# Patient Record
Sex: FEMALE | Race: WHITE | Employment: UNEMPLOYED | URBAN - METROPOLITAN AREA
[De-identification: names, ages, dates, MRNs, and addresses within clinical notes are randomized per-mention and may not be internally consistent; named-entity substitution may affect disease eponyms.]

---

## 2024-08-08 ENCOUNTER — HOSPITAL ENCOUNTER (OUTPATIENT)
Facility: HOSPITAL | Age: 44
Setting detail: OBSERVATION
Discharge: HOME | End: 2024-08-10
Attending: EMERGENCY MEDICINE | Admitting: STUDENT IN AN ORGANIZED HEALTH CARE EDUCATION/TRAINING PROGRAM
Payer: MEDICAID

## 2024-08-08 DIAGNOSIS — K81.9 CHOLECYSTITIS: Primary | ICD-10-CM

## 2024-08-08 DIAGNOSIS — N83.201 RIGHT OVARIAN CYST: ICD-10-CM

## 2024-08-08 DIAGNOSIS — R10.9 RIGHT SIDED ABDOMINAL PAIN: ICD-10-CM

## 2024-08-08 DIAGNOSIS — K81.0 ACUTE CHOLECYSTITIS: ICD-10-CM

## 2024-08-08 LAB
ALBUMIN SERPL-MCNC: 5 G/DL (ref 3.2–4.8)
ALBUMIN/GLOB SERPL: 1.6 {RATIO} (ref 1–2)
ALP LIVER SERPL-CCNC: 57 U/L
ALT SERPL-CCNC: 22 U/L
ANION GAP SERPL CALC-SCNC: 11 MMOL/L (ref 0–18)
AST SERPL-CCNC: 23 U/L (ref ?–34)
BASOPHILS # BLD AUTO: 0.07 X10(3) UL (ref 0–0.2)
BASOPHILS NFR BLD AUTO: 1 %
BILIRUB SERPL-MCNC: 0.7 MG/DL (ref 0.3–1.2)
BUN BLD-MCNC: 8 MG/DL (ref 9–23)
BUN/CREAT SERPL: 12.1 (ref 10–20)
CALCIUM BLD-MCNC: 9.8 MG/DL (ref 8.7–10.4)
CHLORIDE SERPL-SCNC: 106 MMOL/L (ref 98–112)
CO2 SERPL-SCNC: 21 MMOL/L (ref 21–32)
CREAT BLD-MCNC: 0.66 MG/DL
DEPRECATED RDW RBC AUTO: 36.1 FL (ref 35.1–46.3)
EGFRCR SERPLBLD CKD-EPI 2021: 111 ML/MIN/1.73M2 (ref 60–?)
EOSINOPHIL # BLD AUTO: 0.11 X10(3) UL (ref 0–0.7)
EOSINOPHIL NFR BLD AUTO: 1.6 %
ERYTHROCYTE [DISTWIDTH] IN BLOOD BY AUTOMATED COUNT: 12 % (ref 11–15)
GLOBULIN PLAS-MCNC: 3.2 G/DL (ref 2–3.5)
GLUCOSE BLD-MCNC: 94 MG/DL (ref 70–99)
HCT VFR BLD AUTO: 38.6 %
HGB BLD-MCNC: 13.7 G/DL
IMM GRANULOCYTES # BLD AUTO: 0.02 X10(3) UL (ref 0–1)
IMM GRANULOCYTES NFR BLD: 0.3 %
LIPASE SERPL-CCNC: 57 U/L (ref 12–53)
LYMPHOCYTES # BLD AUTO: 1.91 X10(3) UL (ref 1–4)
LYMPHOCYTES NFR BLD AUTO: 27.1 %
MCH RBC QN AUTO: 29.8 PG (ref 26–34)
MCHC RBC AUTO-ENTMCNC: 35.5 G/DL (ref 31–37)
MCV RBC AUTO: 84.1 FL
MONOCYTES # BLD AUTO: 0.39 X10(3) UL (ref 0.1–1)
MONOCYTES NFR BLD AUTO: 5.5 %
NEUTROPHILS # BLD AUTO: 4.54 X10 (3) UL (ref 1.5–7.7)
NEUTROPHILS # BLD AUTO: 4.54 X10(3) UL (ref 1.5–7.7)
NEUTROPHILS NFR BLD AUTO: 64.5 %
OSMOLALITY SERPL CALC.SUM OF ELEC: 284 MOSM/KG (ref 275–295)
PLATELET # BLD AUTO: 310 10(3)UL (ref 150–450)
POTASSIUM SERPL-SCNC: 3.4 MMOL/L (ref 3.5–5.1)
PROT SERPL-MCNC: 8.2 G/DL (ref 5.7–8.2)
RBC # BLD AUTO: 4.59 X10(6)UL
RBC #/AREA URNS AUTO: >10 /HPF
SODIUM SERPL-SCNC: 138 MMOL/L (ref 136–145)
SP GR UR REFRACTOMETRY: 1.02 (ref 1–1.03)
WBC # BLD AUTO: 7 X10(3) UL (ref 4–11)

## 2024-08-08 RX ORDER — KETOROLAC TROMETHAMINE 30 MG/ML
30 INJECTION, SOLUTION INTRAMUSCULAR; INTRAVENOUS ONCE
Status: COMPLETED | OUTPATIENT
Start: 2024-08-08 | End: 2024-08-08

## 2024-08-08 RX ORDER — ONDANSETRON 2 MG/ML
4 INJECTION INTRAMUSCULAR; INTRAVENOUS ONCE
Status: COMPLETED | OUTPATIENT
Start: 2024-08-08 | End: 2024-08-08

## 2024-08-09 ENCOUNTER — APPOINTMENT (OUTPATIENT)
Dept: ULTRASOUND IMAGING | Facility: HOSPITAL | Age: 44
End: 2024-08-09
Attending: EMERGENCY MEDICINE
Payer: MEDICAID

## 2024-08-09 ENCOUNTER — ANESTHESIA (OUTPATIENT)
Dept: SURGERY | Facility: HOSPITAL | Age: 44
End: 2024-08-09
Payer: MEDICAID

## 2024-08-09 ENCOUNTER — ANESTHESIA EVENT (OUTPATIENT)
Dept: SURGERY | Facility: HOSPITAL | Age: 44
End: 2024-08-09
Payer: MEDICAID

## 2024-08-09 ENCOUNTER — APPOINTMENT (OUTPATIENT)
Dept: CT IMAGING | Facility: HOSPITAL | Age: 44
End: 2024-08-09
Attending: EMERGENCY MEDICINE
Payer: MEDICAID

## 2024-08-09 PROBLEM — K81.0 ACUTE CHOLECYSTITIS: Status: ACTIVE | Noted: 2024-08-09

## 2024-08-09 PROBLEM — N83.201 RIGHT OVARIAN CYST: Status: ACTIVE | Noted: 2024-08-09

## 2024-08-09 PROBLEM — R10.9 RIGHT SIDED ABDOMINAL PAIN: Status: ACTIVE | Noted: 2024-08-09

## 2024-08-09 LAB
ANION GAP SERPL CALC-SCNC: 8 MMOL/L (ref 0–18)
B-HCG UR QL: NEGATIVE
BASOPHILS # BLD AUTO: 0.05 X10(3) UL (ref 0–0.2)
BASOPHILS NFR BLD AUTO: 0.9 %
BUN BLD-MCNC: 5 MG/DL (ref 9–23)
BUN/CREAT SERPL: 9.6 (ref 10–20)
CALCIUM BLD-MCNC: 8.4 MG/DL (ref 8.7–10.4)
CHLORIDE SERPL-SCNC: 111 MMOL/L (ref 98–112)
CO2 SERPL-SCNC: 22 MMOL/L (ref 21–32)
CREAT BLD-MCNC: 0.52 MG/DL
DEPRECATED RDW RBC AUTO: 38 FL (ref 35.1–46.3)
EGFRCR SERPLBLD CKD-EPI 2021: 117 ML/MIN/1.73M2 (ref 60–?)
EOSINOPHIL # BLD AUTO: 0.21 X10(3) UL (ref 0–0.7)
EOSINOPHIL NFR BLD AUTO: 3.6 %
ERYTHROCYTE [DISTWIDTH] IN BLOOD BY AUTOMATED COUNT: 12 % (ref 11–15)
GLUCOSE BLD-MCNC: 83 MG/DL (ref 70–99)
HCT VFR BLD AUTO: 35.2 %
HGB BLD-MCNC: 12.2 G/DL
IMM GRANULOCYTES # BLD AUTO: 0.01 X10(3) UL (ref 0–1)
IMM GRANULOCYTES NFR BLD: 0.2 %
LYMPHOCYTES # BLD AUTO: 2.57 X10(3) UL (ref 1–4)
LYMPHOCYTES NFR BLD AUTO: 43.9 %
MCH RBC QN AUTO: 30 PG (ref 26–34)
MCHC RBC AUTO-ENTMCNC: 34.7 G/DL (ref 31–37)
MCV RBC AUTO: 86.5 FL
MONOCYTES # BLD AUTO: 0.48 X10(3) UL (ref 0.1–1)
MONOCYTES NFR BLD AUTO: 8.2 %
NEUTROPHILS # BLD AUTO: 2.53 X10 (3) UL (ref 1.5–7.7)
NEUTROPHILS # BLD AUTO: 2.53 X10(3) UL (ref 1.5–7.7)
NEUTROPHILS NFR BLD AUTO: 43.2 %
OSMOLALITY SERPL CALC.SUM OF ELEC: 288 MOSM/KG (ref 275–295)
PLATELET # BLD AUTO: 258 10(3)UL (ref 150–450)
POTASSIUM SERPL-SCNC: 3.4 MMOL/L (ref 3.5–5.1)
RBC # BLD AUTO: 4.07 X10(6)UL
SODIUM SERPL-SCNC: 141 MMOL/L (ref 136–145)
WBC # BLD AUTO: 5.9 X10(3) UL (ref 4–11)

## 2024-08-09 PROCEDURE — 47562 LAPAROSCOPIC CHOLECYSTECTOMY: CPT | Performed by: SURGERY

## 2024-08-09 PROCEDURE — 0FT44ZZ RESECTION OF GALLBLADDER, PERCUTANEOUS ENDOSCOPIC APPROACH: ICD-10-PCS | Performed by: SURGERY

## 2024-08-09 PROCEDURE — 74177 CT ABD & PELVIS W/CONTRAST: CPT | Performed by: EMERGENCY MEDICINE

## 2024-08-09 PROCEDURE — 76705 ECHO EXAM OF ABDOMEN: CPT | Performed by: EMERGENCY MEDICINE

## 2024-08-09 PROCEDURE — 99244 OFF/OP CNSLTJ NEW/EST MOD 40: CPT | Performed by: SURGERY

## 2024-08-09 PROCEDURE — 99223 1ST HOSP IP/OBS HIGH 75: CPT | Performed by: STUDENT IN AN ORGANIZED HEALTH CARE EDUCATION/TRAINING PROGRAM

## 2024-08-09 RX ORDER — OXYCODONE HYDROCHLORIDE 5 MG/1
10 TABLET ORAL EVERY 4 HOURS PRN
Status: DISCONTINUED | OUTPATIENT
Start: 2024-08-09 | End: 2024-08-10

## 2024-08-09 RX ORDER — POLYETHYLENE GLYCOL 3350 17 G/17G
17 POWDER, FOR SOLUTION ORAL DAILY PRN
Status: DISCONTINUED | OUTPATIENT
Start: 2024-08-09 | End: 2024-08-10

## 2024-08-09 RX ORDER — POLYETHYLENE GLYCOL 3350 17 G/17G
17 POWDER, FOR SOLUTION ORAL DAILY
Qty: 14 PACKET | Refills: 0 | Status: SHIPPED | OUTPATIENT
Start: 2024-08-09 | End: 2024-08-23

## 2024-08-09 RX ORDER — ONDANSETRON 2 MG/ML
4 INJECTION INTRAMUSCULAR; INTRAVENOUS EVERY 6 HOURS PRN
Status: DISCONTINUED | OUTPATIENT
Start: 2024-08-09 | End: 2024-08-10

## 2024-08-09 RX ORDER — LIDOCAINE HYDROCHLORIDE 10 MG/ML
INJECTION, SOLUTION EPIDURAL; INFILTRATION; INTRACAUDAL; PERINEURAL AS NEEDED
Status: DISCONTINUED | OUTPATIENT
Start: 2024-08-09 | End: 2024-08-09 | Stop reason: SURG

## 2024-08-09 RX ORDER — MORPHINE SULFATE 2 MG/ML
1 INJECTION, SOLUTION INTRAMUSCULAR; INTRAVENOUS EVERY 2 HOUR PRN
Status: DISCONTINUED | OUTPATIENT
Start: 2024-08-09 | End: 2024-08-10

## 2024-08-09 RX ORDER — HYDROMORPHONE HYDROCHLORIDE 1 MG/ML
0.6 INJECTION, SOLUTION INTRAMUSCULAR; INTRAVENOUS; SUBCUTANEOUS EVERY 5 MIN PRN
Status: DISCONTINUED | OUTPATIENT
Start: 2024-08-09 | End: 2024-08-09 | Stop reason: HOSPADM

## 2024-08-09 RX ORDER — PHENYLEPHRINE HCL 10 MG/ML
VIAL (ML) INJECTION AS NEEDED
Status: DISCONTINUED | OUTPATIENT
Start: 2024-08-09 | End: 2024-08-09 | Stop reason: SURG

## 2024-08-09 RX ORDER — BUPIVACAINE HYDROCHLORIDE 5 MG/ML
INJECTION, SOLUTION EPIDURAL; INTRACAUDAL AS NEEDED
Status: DISCONTINUED | OUTPATIENT
Start: 2024-08-09 | End: 2024-08-09 | Stop reason: HOSPADM

## 2024-08-09 RX ORDER — ACETAMINOPHEN 500 MG
500 TABLET ORAL EVERY 4 HOURS PRN
Status: DISCONTINUED | OUTPATIENT
Start: 2024-08-09 | End: 2024-08-10

## 2024-08-09 RX ORDER — ROCURONIUM BROMIDE 10 MG/ML
INJECTION, SOLUTION INTRAVENOUS AS NEEDED
Status: DISCONTINUED | OUTPATIENT
Start: 2024-08-09 | End: 2024-08-09 | Stop reason: SURG

## 2024-08-09 RX ORDER — POLYETHYLENE GLYCOL 3350 17 G/17G
17 POWDER, FOR SOLUTION ORAL DAILY PRN
Status: DISCONTINUED | OUTPATIENT
Start: 2024-08-09 | End: 2024-08-09

## 2024-08-09 RX ORDER — MORPHINE SULFATE 4 MG/ML
4 INJECTION, SOLUTION INTRAMUSCULAR; INTRAVENOUS EVERY 10 MIN PRN
Status: DISCONTINUED | OUTPATIENT
Start: 2024-08-09 | End: 2024-08-09 | Stop reason: HOSPADM

## 2024-08-09 RX ORDER — MORPHINE SULFATE 2 MG/ML
2 INJECTION, SOLUTION INTRAMUSCULAR; INTRAVENOUS EVERY 2 HOUR PRN
Status: DISCONTINUED | OUTPATIENT
Start: 2024-08-09 | End: 2024-08-10

## 2024-08-09 RX ORDER — MORPHINE SULFATE 10 MG/ML
6 INJECTION, SOLUTION INTRAMUSCULAR; INTRAVENOUS EVERY 10 MIN PRN
Status: DISCONTINUED | OUTPATIENT
Start: 2024-08-09 | End: 2024-08-09 | Stop reason: HOSPADM

## 2024-08-09 RX ORDER — ENEMA 19; 7 G/133ML; G/133ML
1 ENEMA RECTAL ONCE AS NEEDED
Status: DISCONTINUED | OUTPATIENT
Start: 2024-08-09 | End: 2024-08-09

## 2024-08-09 RX ORDER — NALOXONE HYDROCHLORIDE 0.4 MG/ML
0.08 INJECTION, SOLUTION INTRAMUSCULAR; INTRAVENOUS; SUBCUTANEOUS AS NEEDED
Status: DISCONTINUED | OUTPATIENT
Start: 2024-08-09 | End: 2024-08-09 | Stop reason: HOSPADM

## 2024-08-09 RX ORDER — OXYCODONE HYDROCHLORIDE 5 MG/1
5 TABLET ORAL EVERY 4 HOURS PRN
Status: DISCONTINUED | OUTPATIENT
Start: 2024-08-09 | End: 2024-08-10

## 2024-08-09 RX ORDER — HYDROMORPHONE HYDROCHLORIDE 1 MG/ML
0.2 INJECTION, SOLUTION INTRAMUSCULAR; INTRAVENOUS; SUBCUTANEOUS EVERY 5 MIN PRN
Status: DISCONTINUED | OUTPATIENT
Start: 2024-08-09 | End: 2024-08-09 | Stop reason: HOSPADM

## 2024-08-09 RX ORDER — HYDROMORPHONE HYDROCHLORIDE 1 MG/ML
0.4 INJECTION, SOLUTION INTRAMUSCULAR; INTRAVENOUS; SUBCUTANEOUS EVERY 2 HOUR PRN
Status: DISCONTINUED | OUTPATIENT
Start: 2024-08-09 | End: 2024-08-10

## 2024-08-09 RX ORDER — DEXAMETHASONE SODIUM PHOSPHATE 4 MG/ML
VIAL (ML) INJECTION AS NEEDED
Status: DISCONTINUED | OUTPATIENT
Start: 2024-08-09 | End: 2024-08-09 | Stop reason: SURG

## 2024-08-09 RX ORDER — METRONIDAZOLE 500 MG/100ML
500 INJECTION, SOLUTION INTRAVENOUS ONCE
Status: COMPLETED | OUTPATIENT
Start: 2024-08-09 | End: 2024-08-09

## 2024-08-09 RX ORDER — SENNOSIDES 8.6 MG
17.2 TABLET ORAL NIGHTLY PRN
Status: DISCONTINUED | OUTPATIENT
Start: 2024-08-09 | End: 2024-08-10

## 2024-08-09 RX ORDER — MIDAZOLAM HYDROCHLORIDE 1 MG/ML
INJECTION INTRAMUSCULAR; INTRAVENOUS AS NEEDED
Status: DISCONTINUED | OUTPATIENT
Start: 2024-08-09 | End: 2024-08-09 | Stop reason: SURG

## 2024-08-09 RX ORDER — NEOSTIGMINE METHYLSULFATE 1 MG/ML
INJECTION INTRAVENOUS AS NEEDED
Status: DISCONTINUED | OUTPATIENT
Start: 2024-08-09 | End: 2024-08-09 | Stop reason: SURG

## 2024-08-09 RX ORDER — HYDROMORPHONE HYDROCHLORIDE 1 MG/ML
0.8 INJECTION, SOLUTION INTRAMUSCULAR; INTRAVENOUS; SUBCUTANEOUS EVERY 2 HOUR PRN
Status: DISCONTINUED | OUTPATIENT
Start: 2024-08-09 | End: 2024-08-10

## 2024-08-09 RX ORDER — BISACODYL 10 MG
10 SUPPOSITORY, RECTAL RECTAL
Status: DISCONTINUED | OUTPATIENT
Start: 2024-08-09 | End: 2024-08-10

## 2024-08-09 RX ORDER — HYDROMORPHONE HYDROCHLORIDE 1 MG/ML
0.4 INJECTION, SOLUTION INTRAMUSCULAR; INTRAVENOUS; SUBCUTANEOUS EVERY 5 MIN PRN
Status: DISCONTINUED | OUTPATIENT
Start: 2024-08-09 | End: 2024-08-09 | Stop reason: HOSPADM

## 2024-08-09 RX ORDER — SENNOSIDES 8.6 MG
17.2 TABLET ORAL NIGHTLY PRN
Status: DISCONTINUED | OUTPATIENT
Start: 2024-08-09 | End: 2024-08-09

## 2024-08-09 RX ORDER — MORPHINE SULFATE 4 MG/ML
4 INJECTION, SOLUTION INTRAMUSCULAR; INTRAVENOUS EVERY 2 HOUR PRN
Status: DISCONTINUED | OUTPATIENT
Start: 2024-08-09 | End: 2024-08-10

## 2024-08-09 RX ORDER — HYDROCODONE BITARTRATE AND ACETAMINOPHEN 5; 325 MG/1; MG/1
1 TABLET ORAL EVERY 6 HOURS PRN
Qty: 30 TABLET | Refills: 0 | Status: SHIPPED | OUTPATIENT
Start: 2024-08-09

## 2024-08-09 RX ORDER — PROCHLORPERAZINE EDISYLATE 5 MG/ML
5 INJECTION INTRAMUSCULAR; INTRAVENOUS EVERY 8 HOURS PRN
Status: DISCONTINUED | OUTPATIENT
Start: 2024-08-09 | End: 2024-08-10

## 2024-08-09 RX ORDER — METRONIDAZOLE 500 MG/100ML
500 INJECTION, SOLUTION INTRAVENOUS EVERY 12 HOURS
Status: DISCONTINUED | OUTPATIENT
Start: 2024-08-09 | End: 2024-08-10

## 2024-08-09 RX ORDER — BISACODYL 10 MG
10 SUPPOSITORY, RECTAL RECTAL
Status: DISCONTINUED | OUTPATIENT
Start: 2024-08-09 | End: 2024-08-09

## 2024-08-09 RX ORDER — SODIUM CHLORIDE 9 MG/ML
INJECTION, SOLUTION INTRAVENOUS CONTINUOUS
Status: DISCONTINUED | OUTPATIENT
Start: 2024-08-09 | End: 2024-08-10

## 2024-08-09 RX ORDER — ENEMA 19; 7 G/133ML; G/133ML
1 ENEMA RECTAL ONCE AS NEEDED
Status: DISCONTINUED | OUTPATIENT
Start: 2024-08-09 | End: 2024-08-10

## 2024-08-09 RX ORDER — MORPHINE SULFATE 4 MG/ML
2 INJECTION, SOLUTION INTRAMUSCULAR; INTRAVENOUS EVERY 10 MIN PRN
Status: DISCONTINUED | OUTPATIENT
Start: 2024-08-09 | End: 2024-08-09 | Stop reason: HOSPADM

## 2024-08-09 RX ORDER — HEPARIN SODIUM 5000 [USP'U]/ML
5000 INJECTION, SOLUTION INTRAVENOUS; SUBCUTANEOUS EVERY 12 HOURS SCHEDULED
Status: DISCONTINUED | OUTPATIENT
Start: 2024-08-09 | End: 2024-08-10

## 2024-08-09 RX ORDER — SODIUM CHLORIDE 9 MG/ML
1000 INJECTION, SOLUTION INTRAVENOUS ONCE
Status: COMPLETED | OUTPATIENT
Start: 2024-08-09 | End: 2024-08-10

## 2024-08-09 RX ORDER — SODIUM CHLORIDE, SODIUM LACTATE, POTASSIUM CHLORIDE, CALCIUM CHLORIDE 600; 310; 30; 20 MG/100ML; MG/100ML; MG/100ML; MG/100ML
INJECTION, SOLUTION INTRAVENOUS CONTINUOUS PRN
Status: DISCONTINUED | OUTPATIENT
Start: 2024-08-09 | End: 2024-08-09 | Stop reason: SURG

## 2024-08-09 RX ORDER — SODIUM CHLORIDE, SODIUM LACTATE, POTASSIUM CHLORIDE, CALCIUM CHLORIDE 600; 310; 30; 20 MG/100ML; MG/100ML; MG/100ML; MG/100ML
INJECTION, SOLUTION INTRAVENOUS CONTINUOUS
Status: DISCONTINUED | OUTPATIENT
Start: 2024-08-09 | End: 2024-08-09 | Stop reason: HOSPADM

## 2024-08-09 RX ORDER — GLYCOPYRROLATE 0.2 MG/ML
INJECTION, SOLUTION INTRAMUSCULAR; INTRAVENOUS AS NEEDED
Status: DISCONTINUED | OUTPATIENT
Start: 2024-08-09 | End: 2024-08-09 | Stop reason: SURG

## 2024-08-09 RX ADMIN — LIDOCAINE HYDROCHLORIDE 50 MG: 10 INJECTION, SOLUTION EPIDURAL; INFILTRATION; INTRACAUDAL; PERINEURAL at 10:18:00

## 2024-08-09 RX ADMIN — ONDANSETRON 4 MG: 2 INJECTION INTRAMUSCULAR; INTRAVENOUS at 11:02:00

## 2024-08-09 RX ADMIN — SODIUM CHLORIDE, SODIUM LACTATE, POTASSIUM CHLORIDE, CALCIUM CHLORIDE: 600; 310; 30; 20 INJECTION, SOLUTION INTRAVENOUS at 10:58:00

## 2024-08-09 RX ADMIN — SODIUM CHLORIDE, SODIUM LACTATE, POTASSIUM CHLORIDE, CALCIUM CHLORIDE: 600; 310; 30; 20 INJECTION, SOLUTION INTRAVENOUS at 10:16:00

## 2024-08-09 RX ADMIN — DEXAMETHASONE SODIUM PHOSPHATE 8 MG: 4 MG/ML VIAL (ML) INJECTION at 10:45:00

## 2024-08-09 RX ADMIN — GLYCOPYRROLATE 0.8 MG: 0.2 INJECTION, SOLUTION INTRAMUSCULAR; INTRAVENOUS at 11:06:00

## 2024-08-09 RX ADMIN — MIDAZOLAM HYDROCHLORIDE 2 MG: 1 INJECTION INTRAMUSCULAR; INTRAVENOUS at 10:16:00

## 2024-08-09 RX ADMIN — NEOSTIGMINE METHYLSULFATE 4 MG: 1 INJECTION INTRAVENOUS at 11:06:00

## 2024-08-09 RX ADMIN — PHENYLEPHRINE HCL 50 MCG: 10 MG/ML VIAL (ML) INJECTION at 10:33:00

## 2024-08-09 RX ADMIN — ROCURONIUM BROMIDE 25 MG: 10 INJECTION, SOLUTION INTRAVENOUS at 10:28:00

## 2024-08-09 RX ADMIN — ROCURONIUM BROMIDE 10 MG: 10 INJECTION, SOLUTION INTRAVENOUS at 10:18:00

## 2024-08-09 RX ADMIN — SODIUM CHLORIDE, SODIUM LACTATE, POTASSIUM CHLORIDE, CALCIUM CHLORIDE: 600; 310; 30; 20 INJECTION, SOLUTION INTRAVENOUS at 10:35:00

## 2024-08-09 NOTE — CONSULTS
[facility]  Report of Consultation    Dona Payne Patient Status:  Observation    1980 MRN [id number]   Location [facility] 4W/SW/SE Attending Yudith Calderon MD   Hosp Day # 0 PCP No primary care provider on file.     Requesting Physician:  Samantha Stern MD    Reason for Consultation:  Abdominal pain    Chief Complaint:  Abdominal pain    History of Present Illness:  Patient is Gambian-speaking, daughter at bedside and translates for the patient    Dona Payne is a 44 year old female who presents to [facility] on 2024 for abdominal pain. The pain began Saturday and has been on and off since admission. The pain is in her RUQ and wraps around her back. She does not note any aggravating factors and did not take anything to help her symptoms. She has been nauseous all week since Saturday but vomited only . She denies hematemesis and NSAID use. She also felt feverish Saturday and  but did not record her temperature. She does not have dysuria. She has not had issues with diarrhea or constipation.    Upon presentation to the hospital, patient afebrile and hemodynamically stable. BUN 5, creatinine 0.52, ALT 22, AST 23, alk phos 57, total bili 0.7, WBCs 5.9 hemoglobin 12.2 platelets 2.58.  US GB demonstrates stones in the neck of the gallbladder with mild thickening, no pericholecystic fluid.  CT Abdo pelvis demonstrates cholelithiasis with mild distention of gallbladder with no biliary duct dilatation.     Patient denies significant past medical history    Patient denies prior abdominal surgery history    Family history is negative for colon cancer as per patient    History:  Past Medical History:    Varicose veins of both lower extremities with complications    Varicose veins of left lower extremity with complications     History reviewed. No pertinent surgical history.  Family History   Problem Relation Age of Onset    Diabetes Mother     Hypertension  Mother       reports that she has never smoked. She does not have any smokeless tobacco history on file. She reports that she does not drink alcohol and does not use drugs.    Allergies:  No Known Allergies    Medications:  Medications Prior to Admission   Medication Sig    UNKNOWN TO PATIENT - BIRTH CONTROL Take 1 tablet by mouth daily.    Vitamin D, Cholecalciferol, 1000 UNITS Oral Cap Take by mouth. (Patient not taking: Reported on 8/8/2024)         Current Facility-Administered Medications:     sodium chloride 0.9% infusion, , Intravenous, Continuous    acetaminophen (Tylenol Extra Strength) tab 500 mg, 500 mg, Oral, Q4H PRN    polyethylene glycol (PEG 3350) (Miralax) 17 g oral packet 17 g, 17 g, Oral, Daily PRN    sennosides (Senokot) tab 17.2 mg, 17.2 mg, Oral, Nightly PRN    bisacodyl (Dulcolax) 10 MG rectal suppository 10 mg, 10 mg, Rectal, Daily PRN    fleet enema (Fleet) 7-19 GM/118ML rectal enema 133 mL, 1 enema, Rectal, Once PRN    ondansetron (Zofran) 4 MG/2ML injection 4 mg, 4 mg, Intravenous, Q6H PRN    prochlorperazine (Compazine) 10 MG/2ML injection 5 mg, 5 mg, Intravenous, Q8H PRN    morphINE PF 2 MG/ML injection 1 mg, 1 mg, Intravenous, Q2H PRN **OR** morphINE PF 2 MG/ML injection 2 mg, 2 mg, Intravenous, Q2H PRN **OR** morphINE PF 4 MG/ML injection 4 mg, 4 mg, Intravenous, Q2H PRN    metRONIDAZOLE in sodium chloride 0.79% (Flagyl) 5 mg/mL IVPB premix 500 mg, 500 mg, Intravenous, Q12H    cefTRIAXone (Rocephin) 2 g in sodium chloride 0.9% 100 mL IVPB-ADDV, 2 g, Intravenous, Q24H    potassium chloride 40 mEq in 250mL sodium chloride 0.9% IVPB premix, 40 mEq, Intravenous, Once    Review of Systems:  Review of Systems   Constitutional:  Positive for diaphoresis. Negative for chills, fatigue and fever.   Respiratory:  Negative for shortness of breath and wheezing.    Cardiovascular:  Negative for chest pain and leg swelling.   Gastrointestinal:  Positive for nausea, vomiting and abdominal pain.  Negative for diarrhea and constipation.   Genitourinary:  Negative for dysuria, hematuria and flank pain.   Neurological:  Negative for dizziness and weakness.       Physical Exam:  /80 (BP Location: Right arm)   Pulse 51   Temp 98 °F (36.7 °C) (Oral)   Resp 18   Ht 5' 2\" (1.575 m)   Wt 151 lb 6.4 oz (68.7 kg)   LMP 07/07/2024 (Approximate)   SpO2 98%   BMI 27.69 kg/m²   Physical Exam  Constitutional:       General: She is not in acute distress.     Appearance: Normal appearance. She is normal weight. She is not ill-appearing or toxic-appearing.   HENT:      Head: Normocephalic and atraumatic.      Mouth/Throat:      Mouth: Mucous membranes are moist.      Pharynx: Oropharynx is clear.   Eyes:      Conjunctiva/sclera: Conjunctivae normal.   Cardiovascular:      Rate and Rhythm: Normal rate and regular rhythm.      Pulses: Normal pulses.      Heart sounds: Normal heart sounds.   Pulmonary:      Effort: Pulmonary effort is normal.      Breath sounds: Normal breath sounds.   Abdominal:      General: Abdomen is flat. Bowel sounds are normal. There is no distension.      Palpations: Abdomen is soft.      Tenderness: There is abdominal tenderness in the right upper quadrant.   Musculoskeletal:         General: No swelling.   Skin:     General: Skin is warm and dry.   Neurological:      General: No focal deficit present.      Mental Status: She is alert and oriented to person, place, and time.   Psychiatric:         Mood and Affect: Mood normal.         Behavior: Behavior normal.         Laboratory Data:  Recent Labs   Lab 08/08/24 2312 08/09/24  0620   RBC 4.59 4.07   HGB 13.7 12.2   HCT 38.6 35.2   MCV 84.1 86.5   MCH 29.8 30.0   MCHC 35.5 34.7   RDW 12.0 12.0   NEPRELIM 4.54 2.53   WBC 7.0 5.9   .0 258.0       Recent Labs   Lab 08/08/24 2312 08/09/24  0620   GLU 94 83   BUN 8* 5*   CREATSERUM 0.66 0.52*   CA 9.8 8.4*   ALB 5.0*  --     141   K 3.4* 3.4*    111   CO2 21.0 22.0   ALKPHO  57  --    AST 23  --    ALT 22  --    BILT 0.7  --    TP 8.2  --          No results for input(s): \"PTP\", \"INR\", \"PTT\" in the last 168 hours.      No results found.              Medical Decision Making         Impression:  Patient Active Problem List   Diagnosis    Varicose veins of both lower extremities with complications    Varicose veins of left lower extremity with complications    Acute cholecystitis    Right sided abdominal pain    Right ovarian cyst       Patient is a 44-year-old female that was admitted to the hospital for abdominal pain.  US GB demonstrates large stone in neck of gallbladder with mild thickening.  CT Abdo pelvis demonstrates cholelithiasis with mild distention of gallbladder and no biliary duct dilatation.     Plan:  N.p.o., sips with meds  Laparoscopic cholecystectomy today  Analgesics and antiemetics as needed  Continue IV antibiotics as per hospitalist    GUILLE Duarte  8/9/2024  9:11 AM    Addendum:    Pt seen and examined.  I agree with Johanny Velasquez's, PA-C note.  Plan for lap jose maria today.    Alphonse Pagan MD

## 2024-08-09 NOTE — ANESTHESIA PREPROCEDURE EVALUATION
Anesthesia PreOp Note    HPI:     Dona Payne is a 44 year old female who presents for preoperative consultation requested by: Alphonse Pagan MD    Date of Surgery: 8/9/2024    Procedure(s):  LAPAROSCOPIC CHOLECYSTECTOMY POSSIBLE OPEN  Indication: Cholecystitis [K81.9]    Relevant Problems   No relevant active problems       NPO:                         History Review:  Patient Active Problem List    Diagnosis Date Noted    Acute cholecystitis 08/09/2024    Right sided abdominal pain 08/09/2024    Right ovarian cyst 08/09/2024    Varicose veins of left lower extremity with complications 06/14/2016    Varicose veins of both lower extremities with complications 05/13/2016       Past Medical History:    Varicose veins of both lower extremities with complications    Varicose veins of left lower extremity with complications       History reviewed. No pertinent surgical history.    Medications Prior to Admission   Medication Sig Dispense Refill Last Dose    UNKNOWN TO PATIENT - BIRTH CONTROL Take 1 tablet by mouth daily.   Past Week    Vitamin D, Cholecalciferol, 1000 UNITS Oral Cap Take by mouth. (Patient not taking: Reported on 8/8/2024)   Not Taking     Current Facility-Administered Medications Ordered in Epic   Medication Dose Route Frequency Provider Last Rate Last Admin    sodium chloride 0.9% infusion   Intravenous Continuous Samantha Stern  mL/hr at 08/09/24 0502 New Bag at 08/09/24 0502    acetaminophen (Tylenol Extra Strength) tab 500 mg  500 mg Oral Q4H PRN Samantha Stern MD        polyethylene glycol (PEG 3350) (Miralax) 17 g oral packet 17 g  17 g Oral Daily PRN Samantha Stern MD        sennosides (Senokot) tab 17.2 mg  17.2 mg Oral Nightly PRN Samantha Stern MD        bisacodyl (Dulcolax) 10 MG rectal suppository 10 mg  10 mg Rectal Daily PRN Samantha Stern MD        fleet enema (Fleet) 7-19 GM/118ML rectal enema 133 mL  1 enema Rectal Once PRN Samantha Stern MD         ondansetron (Zofran) 4 MG/2ML injection 4 mg  4 mg Intravenous Q6H PRN Samantha Stern MD        prochlorperazine (Compazine) 10 MG/2ML injection 5 mg  5 mg Intravenous Q8H PRN Samantha Stern MD        morphINE PF 2 MG/ML injection 1 mg  1 mg Intravenous Q2H PRN Samantha Stern MD   1 mg at 08/09/24 0616    Or    morphINE PF 2 MG/ML injection 2 mg  2 mg Intravenous Q2H PRN Samantha Stern MD        Or    morphINE PF 4 MG/ML injection 4 mg  4 mg Intravenous Q2H PRN Samantha Stern MD        metRONIDAZOLE in sodium chloride 0.79% (Flagyl) 5 mg/mL IVPB premix 500 mg  500 mg Intravenous Q12H Samantha Stern MD        cefTRIAXone (Rocephin) 2 g in sodium chloride 0.9% 100 mL IVPB-ADDV  2 g Intravenous Q24H Samantha Stern MD        potassium chloride 40 mEq in 250mL sodium chloride 0.9% IVPB premix  40 mEq Intravenous Once Samantha Stern MD 62.5 mL/hr at 08/09/24 0611 40 mEq at 08/09/24 0611     No current [facility]-ordered outpatient medications on file.       No Known Allergies    Family History   Problem Relation Age of Onset    Diabetes Mother     Hypertension Mother      Social History     Socioeconomic History    Marital status:    Tobacco Use    Smoking status: Never   Vaping Use    Vaping status: Never Used   Substance and Sexual Activity    Alcohol use: Never    Drug use: Never       Available pre-op labs reviewed.  Lab Results   Component Value Date    WBC 5.9 08/09/2024    RBC 4.07 08/09/2024    HGB 12.2 08/09/2024    HCT 35.2 08/09/2024    MCV 86.5 08/09/2024    MCH 30.0 08/09/2024    MCHC 34.7 08/09/2024    RDW 12.0 08/09/2024    .0 08/09/2024    PREGU Negative 08/09/2024     Lab Results   Component Value Date     08/09/2024    K 3.4 (L) 08/09/2024     08/09/2024    CO2 22.0 08/09/2024    BUN 5 (L) 08/09/2024    CREATSERUM 0.52 (L) 08/09/2024    GLU 83 08/09/2024    CA 8.4 (L) 08/09/2024          Vital Signs:  Body mass index is 27.69 kg/m².    height is 1.575 m (5' 2\") and weight is 68.7 kg (151 lb 6.4 oz). Her oral temperature is 98 °F (36.7 °C). Her blood pressure is 121/80 and her pulse is 51. Her respiration is 18 and oxygen saturation is 98%.   Vitals:    08/09/24 0230 08/09/24 0300 08/09/24 0400 08/09/24 0429   BP: 127/75 118/84 120/72 121/80   Pulse: 70 67 70 51   Resp: 18 18 18 18   Temp:    98 °F (36.7 °C)   TempSrc:    Oral   SpO2: 96% 97% 98% 98%   Weight:    68.7 kg (151 lb 6.4 oz)   Height:            Anesthesia Evaluation      Airway   Mallampati: I  TM distance: >3 FB  Neck ROM: full  Dental      Pulmonary - normal exam   Cardiovascular - normal exam    Neuro/Psych      GI/Hepatic/Renal      Endo/Other    Abdominal  - normal exam                 Anesthesia Plan:   ASA:  2  Plan:   General  Airway:  ETT  Informed Consent Plan and Risks Discussed With:  Patient      I have informed Dona Payne and/or legal guardian or family member of the nature of the anesthetic plan, benefits, risks including possible dental damage if relevant, major complications, and any alternative forms of anesthetic management.   All of the patient's questions were answered to the best of my ability. The patient desires the anesthetic management as planned.  JOSE JUAN MCRAE MD  8/9/2024 8:49 AM  Present on Admission:  **None**

## 2024-08-09 NOTE — ANESTHESIA POSTPROCEDURE EVALUATION
Patient: Dona Payne    Procedure Summary       Date: 08/09/24 Room / Location: [facility] MAIN OR 07 / [facility] MAIN OR    Anesthesia Start: 1016 Anesthesia Stop:     Procedure: LAPAROSCOPIC CHOLECYSTECTOMY POSSIBLE OPEN (Abdomen) Diagnosis:       Cholecystitis      (Cholecystitis [K81.9])    Surgeons: Alphonse Pagan MD Anesthesiologist: Irene Rodrigues MD    Anesthesia Type: general ASA Status: 2            Anesthesia Type: general    Vitals Value Taken Time   /76 08/09/24 1123   Temp 97.6 08/09/24 1123   Pulse 80 08/09/24 1123   Resp 17 08/09/24 1123   SpO2 98 % 08/09/24 1123   Vitals shown include unfiled device data.    EM AN Post Evaluation:   Patient Evaluated in PACU  Patient Participation: complete - patient participated  Level of Consciousness: awake and alert  Pain Score: 0  Pain Management: adequate  Airway Patency:patent  Dental exam unchanged from preop  Yes    Nausea/Vomiting: none  Cardiovascular Status: acceptable  Respiratory Status: acceptable  Postoperative Hydration acceptable      JEREMY DOMINGUEZ CRNA  8/9/2024 11:23 AM

## 2024-08-09 NOTE — PLAN OF CARE
Received from ED this am. A&ox4, Georgian only, daughter at bedside to translate. RA. SCDs for dvt prophylaxis. PRN morphine and heat packs for pain. IVF to R arm PIV. Potassium replaced per protocol. NPO. Up independent. Call light in reach and using appropriately.   Problem: Patient Centered Care  Goal: Patient preferences are identified and integrated in the patient's plan of care  Description: Interventions:  - What would you like us to know as we care for you? Home with family  - Provide timely, complete, and accurate information to patient/family  - Incorporate patient and family knowledge, values, beliefs, and cultural backgrounds into the planning and delivery of care  - Encourage patient/family to participate in care and decision-making at the level they choose  - Honor patient and family perspectives and choices  Outcome: Progressing

## 2024-08-09 NOTE — ED INITIAL ASSESSMENT (HPI)
Pt presents with c/o right back pain that radiates around to her abdomen.  +nausea, vomited on Sunday.  No diarrhea. No urinary symptoms.

## 2024-08-09 NOTE — DISCHARGE INSTRUCTIONS
Home Care Instructions  LAPAROSCOPIC CHOLECYSTECTOMY      1. You have incisions with absorbable sutures underneath the skin so no suture removal are needed.      2. You can shower the day after surgery.  The incisions can get wet with water and soap.  Just pat your incisions dry after showering.  Avoid soaking in a bath tub for one week.  Avoid heavy lifting (greater than 10 lbs or anything heavier than a gallon of milk) for six weeks after surgery.    3. Be up and around when you are home.  The more you walk, the faster your recovery will be.    4. You may have an abdominal binder (medical girdle).  Wear it when you are up and moving around.  The bottom of the binder should cover a little of your hip bones to provide support to your lower abdomen.  Avoid wearing the binder too high as it may make your discomfort worse.    5. Take pain medications around the clock for the first few days after surgery regardless if you have pain or not.  The pain medications take about 30 minutes to work so if you wait until you experience pain, then you might be uncomfortable during those 30 minutes.    6.  A well known side effect of pain medication is constipation.  It is the number 1, 2, and 3 reason why patients call me after surgery.  Adequate hydration and stool softeners are ways to minimize the risk of constipation after surgery.  Drink a lot of fluid when you are at home.  Check your urine color.  If it's dark, then you are dehydrated and need to drink more water.  Take as many stool softeners (morning, noon, evening) as you need to have about one bowel movement a day.  Don't let four or five days go by without a bowel movement.  If that occurs, then you might need a rectal suppository or an enema to treat the constipation.    7.  You may drive when you are no longer taking prescription pain medications with narcotics.  If your degree of discomfort is minimal, extra strength tylenol alternating with ibuprofen could be used  as necessary.    8.  Please contact the office ([phone]) to schedule a telephone visit approximately two weeks after surgery.  At that time, if there are any issues, then we will schedule an in person clinic visit.    9. Signs and symptoms of post-operative problems include abdominal pain associated with nausea and/or vomiting, fever, chills, excessive drainage or pain at the incision sites, leg swelling/pain, or chest pain. Contact our office immediately if these signs or symptoms occur.    10. It is not unusual for you to experience pain similar to the “gallbladder attacks” that you had pre-operatively. Diarrhea can also occur, as well as persistent food intolerance. These symptoms are usually self limiting and will resolve on their own in several weeks.

## 2024-08-09 NOTE — OPERATIVE REPORT
[facility]  part of [facility]     Operative Report    Patient Name:  Dona Payne  MR:  [id number]  :  1980  DOS:  24    Preop Dx:  Cholecystitis [K81.9]  Postop Dx:  Cholecystitis [K81.9]  Procedure:  Laparoscopic cholecystectomy  Surgeon:  Alphonse Pagan MD  Assistant:  Juan Daniel Ambrosio CSA  EBL: 10 ml  Complication:  None    INDICATION:  Pt is a 44 year old female who with Cholecystitis [K81.9] who is scheduled for a LAPAROSCOPIC CHOLECYSTECTOMY POSSIBLE OPEN.    CONSENT:  An informed consent discussion was held with the patient regarding the nature of Cholecystitis [K81.9], the treatment options and the details of the procedure.  The risks including but not limited to bleeding, wound infection, intra-abdominal infection, injury to the liver, colon, small intestine, pancreas, stomach, common bile duct, incomplete resection, cystic duct stump leak, retained stone and incisional hernia were discussed.  The patient expressed understanding and want to proceed with the planned procedure.    TECHNIQUE:  The patient was taken to the OR and placed in supine position.  General anesthesia was established and the abdomen was prepped in standard fashion.  Pneumoperitoneum was obtained using open technique through a supra-umbilical incision.  A 12-mm trocar was inserted under direct visualization and no injury occurred.  Examination of the abdomen showed a massively dilated, thickened and inflamed appearing gallbladder consistent with Cholecystitis [K81.9].  Three 5-mm trocars were placed in the epigastric and right abdomen.  The patient was placed in reverse Trendelenburg position.  I decompressed the gallbladder with a needle aspirator.  The fundus was grasped and retracted cephalad.  The infundibulum was grasped and retracted inferior, anterior, and to the right.  The peritoneum along the medial and lateral aspect of the gallbladder/liver edge were incised using hook cautery.  The lower 1/3  of the gallbladder was dissected from the liver.  Two structures, identified as the cystic duct and artery, are seen entering the infundibulum, thus obtaining the so called \"critical view of safety\".  The cystic artery was divided using the [place] ligasure.  The cystic duct was clipped and divided.  The gallbladder was detached from the liver using hook cautery and delivered from the abdomen using an endocatch bag.  The abdominal cavity was irrigated with saline and found to be hemostatic.  The trocars were removed under direct visualization and no port site bleeding was seen.  The supra-umbilical fascia was closed using 0 vicryl.  The skin incisions were closed using 4-0 vicryl.  Sterile dressings were applied.  All instrument and sponge counts were correct.  I was present during the critical portions of the procedure.    Alphonse Pagan MD

## 2024-08-09 NOTE — PLAN OF CARE
Dona is aox4, tolerating diet, ambulating ad epifanio, voiding freely. Denies pain only discomfort after surgery. Lap Elly done today. Family at bedside. Electrolytes replaced per protocol. IV Abx continued. Call light within reach, calls appropriately. Safety plan in place.     Problem: Patient Centered Care  Goal: Patient preferences are identified and integrated in the patient's plan of care  Description: Interventions:  - What would you like us to know as we care for you?   - Provide timely, complete, and accurate information to patient/family  - Incorporate patient and family knowledge, values, beliefs, and cultural backgrounds into the planning and delivery of care  - Encourage patient/family to participate in care and decision-making at the level they choose  - Honor patient and family perspectives and choices  8/9/2024 1334 by Nuria Glass RN  Outcome: Progressing  8/9/2024 1334 by Nuria Glass RN  Outcome: Progressing     Problem: Patient/Family Goals  Goal: Patient/Family Long Term Goal  Description: Patient's Long Term Goal:     Interventions:  -   - See additional Care Plan goals for specific interventions  8/9/2024 1334 by Nuria Glass RN  Outcome: Progressing  8/9/2024 1334 by Nuria Glass RN  Outcome: Progressing  Goal: Patient/Family Short Term Goal  Description: Patient's Short Term Goal:     Interventions:   -   - See additional Care Plan goals for specific interventions  8/9/2024 1334 by Nuria Glass RN  Outcome: Progressing  8/9/2024 1334 by Nuria Glass RN  Outcome: Progressing     Problem: PAIN - ADULT  Goal: Verbalizes/displays adequate comfort level or patient's stated pain goal  Description: INTERVENTIONS:  - Encourage pt to monitor pain and request assistance  - Assess pain using appropriate pain scale  - Administer analgesics based on type and severity of pain and evaluate response  - Implement non-pharmacological measures as appropriate and evaluate response  - Consider  cultural and social influences on pain and pain management  - Manage/alleviate anxiety  - Utilize distraction and/or relaxation techniques  - Monitor for opioid side effects  - Notify MD/LIP if interventions unsuccessful or patient reports new pain  - Anticipate increased pain with activity and pre-medicate as appropriate  Outcome: Progressing     Problem: RISK FOR INFECTION - ADULT  Goal: Absence of fever/infection during anticipated neutropenic period  Description: INTERVENTIONS  - Monitor WBC  - Administer growth factors as ordered  - Implement neutropenic guidelines  Outcome: Progressing     Problem: SAFETY ADULT - FALL  Goal: Free from fall injury  Description: INTERVENTIONS:  - Assess pt frequently for physical needs  - Identify cognitive and physical deficits and behaviors that affect risk of falls.  - [place] fall precautions as indicated by assessment.  - Educate pt/family on patient safety including physical limitations  - Instruct pt to call for assistance with activity based on assessment  - Modify environment to reduce risk of injury  - Provide assistive devices as appropriate  - Consider OT/PT consult to assist with strengthening/mobility  - Encourage toileting schedule  Outcome: Progressing

## 2024-08-09 NOTE — PROGRESS NOTES
[facility] Pharmacy Note: Antimicrobial Weight Based Dose Adjustment for: ceftriaxone (ROCEPHIN)    Dona Payne is a 44 year old patient who has been prescribed ceftriaxone (ROCEPHIN) 1gm every 24 hours.    Estimated Creatinine Clearance: 86 mL/min (based on SCr of 0.66 mg/dL).  Wt Readings from Last 6 Encounters:   08/09/24 68.7 kg (151 lb 6.4 oz)   06/14/16 74.8 kg (165 lb)   05/13/16 68 kg (150 lb)     Body mass index is 27.69 kg/m².    Based on this criteria and renal function, dose will be adjusted to ceftriaxone (ROCEPHIN) 2gm every 24 hours.    Thank you,    Isai Mayer, PharmD  8/9/2024  4:33 AM

## 2024-08-09 NOTE — ED PROVIDER NOTES
Patient Seen in: [facility] Emergency Department      History     Chief Complaint   Patient presents with    Abdomen/Flank Pain     Stated Complaint: Abd pain/flank pain/back pain    Subjective:   HPI    Very pleasant Stateless-speaking 44-year-old presents with her daughter for evaluation of right-sided flank and abdominal pain onset over the past 5 days.  It was initially more severe 5 days ago and then has been intermittent and progressively worsening since then, more constant this evening.  She reports associated nausea and vomiting 3 days ago but only nausea today.  No diarrhea or bloody stools.  Denies dysuria or hematuria.  No fevers or chills over the past few days but did have subjective fevers over the weekend with chills.  She denies chest pain or shortness of breath.  No vaginal bleeding or discharge.  She has never had kidney stones before.  She has tried Tylenol without relief.    Objective:   Past Medical History:    Varicose veins of both lower extremities with complications    Varicose veins of left lower extremity with complications              History reviewed. No pertinent surgical history.             Social History     Socioeconomic History    Marital status:    Tobacco Use    Smoking status: Never   Vaping Use    Vaping status: Never Used   Substance and Sexual Activity    Alcohol use: Never    Drug use: Never     Social Determinants of Health     Financial Resource Strain: Not on File (10/7/2022)    Received from [employer]    Financial Resource Strain     Financial Resource Strain: 0   Food Insecurity: Not on File (10/7/2022)    Received from [employer]    Food Insecurity     Food: 0   Transportation Needs: Not on File (10/7/2022)    Received from [employer]    Transportation Needs     Transportation: 0   Physical Activity: Not on File (10/7/2022)    Received from [employer]    Physical Activity     Physical Activity: 0   Stress: Not on File (10/7/2022)    Received from [employer]    Stress      Stress: 0   Social Connections: Not on File (10/7/2022)    Received from [employer]     Social Connections and Isolation: 0   Housing Stability: Not on File (10/7/2022)    Received from [employer]    Housing Stability     Housin              Review of Systems    Positive for stated Chief Complaint: Abdomen/Flank Pain    Other systems are as noted in HPI.  Constitutional and vital signs reviewed.      All other systems reviewed and negative except as noted above.    Physical Exam     ED Triage Vitals [24 2235]   BP (!) 152/93   Pulse 86   Resp 18   Temp 99.2 °F (37.3 °C)   Temp src Oral   SpO2 98 %   O2 Device        Current Vitals:   Vital Signs  BP: (!) 152/93  Pulse: 86  Resp: 18  Temp: 99.2 °F (37.3 °C)  Temp src: Oral    Oxygen Therapy  SpO2: 98 %            Physical Exam  Vitals and nursing note reviewed.   Constitutional:       General: She is not in acute distress.  HENT:      Head: Normocephalic and atraumatic.      Right Ear: External ear normal.      Left Ear: External ear normal.      Nose: Nose normal.      Mouth/Throat:      Mouth: Mucous membranes are moist.   Eyes:      Conjunctiva/sclera: Conjunctivae normal.   Cardiovascular:      Rate and Rhythm: Normal rate and regular rhythm.      Heart sounds: No murmur heard.  Pulmonary:      Effort: Pulmonary effort is normal. No respiratory distress.      Breath sounds: No wheezing, rhonchi or rales.   Abdominal:      General: There is no distension.      Palpations: Abdomen is soft.      Tenderness: There is abdominal tenderness in the right upper quadrant and right lower quadrant. There is right CVA tenderness. There is no left CVA tenderness, guarding or rebound. Negative signs include Child's sign and McBurney's sign.      Hernia: There is no hernia in the umbilical area or ventral area.   Musculoskeletal:         General: No deformity.   Skin:     General: Skin is warm and dry.      Capillary Refill: Capillary refill takes less  than 2 seconds.      Findings: No rash.   Neurological:      General: No focal deficit present.      Mental Status: She is alert.               ED Course     Labs Reviewed   COMP METABOLIC PANEL (14) - Abnormal; Notable for the following components:       Result Value    Potassium 3.4 (*)     BUN 8 (*)     Albumin 5.0 (*)     All other components within normal limits   LIPASE - Abnormal; Notable for the following components:    Lipase 57 (*)     All other components within normal limits   URINALYSIS WITH CULTURE REFLEX - Abnormal; Notable for the following components:    Urine Color Light-Orange (*)     Clarity Urine Ex.Turbid (*)     WBC Urine 21-50 (*)     RBC Urine >10 (*)     Bacteria Urine 1+ (*)     Squamous Epi. Cells Few (*)     All other components within normal limits   SPECIFIC GRAVITY, URINE - Normal   CBC WITH DIFFERENTIAL WITH PLATELET   RAINBOW DRAW BLUE   RAINBOW DRAW GOLD   URINE CULTURE, ROUTINE               MDM      This patient presents with R flank and R sided abdominal pain. Exam is as above.     Differential diagnosis includes:    Upper GI pathology including cholecystitis, choledocholithiasis, pancreatitis, biliary colic     Lower GI pathology including diverticulitis, appendicitis, intraabdominal abscess, volvulus      pathology including pregnancy, kidney stone, pyelonephritis or UTI, less likely TOA       Plan: will obtain cbc, cmp, lipase, upreg, UA/Ucx, CTAP with contrast for further disposition     Toradol, zofran, IV NS for pain and symptom control, NPO for now      Admission disposition: 8/9/2024  1:38 AM           ED Course as of 08/09/24 0139  ------------------------------------------------------------  Time: 08/08 2352  Value: Urinalysis with Culture Reflex(!)  Comment: UA turbid with hematuria  ------------------------------------------------------------  Time: 08/08 2352  Value: WBC: 7.0  Comment: No leukocytosis or left shift. Pt is afebrile.    ------------------------------------------------------------  Time: 08/08 2354  Value: Lipase(!): 57  Comment: Minimal lipase elevation. Lfts normal. CMP otherwise unremarkable. Cbc unremarkable.   ------------------------------------------------------------  Time: 08/09 0040  Value: CT ABDOMEN+PELVIS(CONTRAST ONLY)(CPT=74177)  Comment: CT Abdomen and Pelvis with IV contrast  COMPARISON: None     IMPRESSION:  Cholelithiasis with moderate distention of the gallbladder.  Suggestion of mild gallbladder wall thickening.  Findings could reflect early acute cholecystitis.  No biliary ductal dilation.  If it would aid in further management decisions, ultrasound may be performed to further assess.    Punctate nonobstructive stone in the lower pole left kidney measuring approximately 3 mm.  No hydronephrosis or hydroureter.  No ureteral stone.    Normal appendix.  Bowel is unremarkable.  Right adnexal cystic structure measuring approximately 2.9 x 2.3 cm, of doubtful clinical significance unless there is acute pain in this region which would warrant further evaluation with dedicated pelvic ultrasound.    Case results were faxed/electronically transmitted at 1:32 AM ET.  If there are any questions please feel free to contact me directly at [phone]  ext [phone]. If you cannot reach me at this number, do not leave a voicemail. Please call [phone] ext [phone] and ask for the next available radiologist.    ------------------------------------------------------------  Time: 08/09 0118  Comment: Stone in neck of gallbladder and mild thickening but no pericholecystic fluid. Pt updated with . Case d/w Dr. Pagan accepts consult. Rocephin flagyl ordered. Case d/w hospitalist accepts admission   ------------------------------------------------------------  Time: 08/09 0139  Comment: Gallbladder ultrasound    Comparison: CT abdomen and pelvis 8/9/2024    IMPRESSION:  Cholelithiasis with a large 3.9 cm stone  impacted in the neck of gallbladder  Gallbladder is distended with maximum diameter of 3.7 cm  Earlier CT demonstrated a tensile gallbladder fundus sign(early sign of cholecystitis)  No appreciable gallbladder wall thickening  Negative sonographic Child sign(though medicated)    In the appropriate context, would raise suspicion for early acute cholecystitis    CBD is nondilated  Measures 5 mm in diameter which is upper limits of normal    Visualized portions of pancreas and liver appear normal  Right kidney appears normal           Disposition and Plan     Clinical Impression:  1. Acute cholecystitis    2. Right sided abdominal pain         Disposition:  Admit  8/9/2024  1:38 am      Veronica Dodge DO  Attending Physician  Emergency Medicine                     Hospital Problems       Present on Admission             ICD-10-CM Noted POA    Acute cholecystitis K81.0 8/9/2024 Unknown

## 2024-08-09 NOTE — ANESTHESIA PROCEDURE NOTES
Airway  Date/Time: 8/9/2024 10:22 AM  Urgency: elective      General Information and Staff    Patient location during procedure: OR  Resident/CRNA: Aiyana Bates CRNA  Performed: CRNA   Performed by: Aiyana Bates CRNA  Authorized by: Irene Rordigues MD      Indications and Patient Condition  Indications for airway management: anesthesia  Spontaneous ventilation: present  Sedation level: deep  Preoxygenated: yes  Patient position: sniffing  MILS maintained throughout  Mask difficulty assessment: 0 - not attempted  No planned trial extubation    Final Airway Details  Final airway type: endotracheal airway      Successful airway: ETT  Cuffed: yes   Successful intubation technique: direct laryngoscopy  Facilitating devices/methods: cricoid pressure  Endotracheal tube insertion site: oral  Blade: Smita  Blade size: #3  ETT size (mm): 7.0    Cormack-Lehane Classification: grade IIA - partial view of glottis  Placement verified by: capnometry   Cuff volume (mL): 6  Measured from: lips  ETT to lips (cm): 21  Number of attempts at approach: 1  Number of other approaches attempted: 0

## 2024-08-09 NOTE — H&P
[facility]  part of [facility]    History & Physical    Dona Payne Patient Status:  Emergency    1980 MRN [id number]   Location [facility] EMERGENCY DEPARTMENT Attending Veronica Dodge DO   Hosp Day # 0 PCP No primary care provider on file.     Date:  2024  Date of Admission:  2024    Chief Complaint:  Chief Complaint   Patient presents with    Abdomen/Flank Pain     Assessment and Plan:    RUQ abdominal pain  Acute cholecystitis  -Gallbladder ultrasound reveals cholelithiasis with a large 3.9 cm stone impacted in the neck of the gallbladder, early signs of cholecystitis.  Normal LFTs  -General Surgery consulted who recommends IV antibiotics, possible cholecystectomy in the a.m.  -Continue with Rocephin/Flagyl  -Maintain n.p.o.  -Initiate IV fluid hydration  -Pain control    Prophylaxis  SCDs    CODE STATUS  Full    History of Present Illness:  Dona Payne is a(n) 44 year old female, who presents for evaluation of abdominal pain that started 4 to 5 days prior to current presentation.  She has felt nauseous but has not vomited.  The pain started in the abdomen and radiated to the right upper quadrant, pain is constant and has worsened.  Patient denies previous similar pain in the past.  States that she still has her gallbladder.  No chest pain or shortness of breath.  Denies any fever or chills.  Denies any history of kidney stones.  No increase in urinary frequency or dysuria.  The abdominal pain was not associated with food.  Patient reports that she is otherwise healthy and does not take any medications.  On presentation to the ED, initial vital signs reveal temp 99.2, heart rate 86, respiratory rate 18, blood pressure 152/93, SpO2 98% on room air.  Nontoxic-appearing.  Lab work reveals mildly elevated lipase level 57, liver enzymes within normal limit.  CT abdomen pelvis reveals cholelithiasis with moderate distention of the gallbladder no biliary duct dilatation,  however, went ultrasound of the gallbladder reveals stones in the neck of the gallbladder and mild thickening but no pericholecystic fluid.  Case discussed with on-call general surgeon who recommends admission and IV antibiotics, for possible cholecystectomy in the a.m.  Patient received a dose of Rocephin and Flagyl as well as 1 L IV fluid to Zofran as well as Toradol.  Patient admitted under hospitalist service with consultation to general surgery.     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 No pertinent surgical history.  Family History   Problem Relation Age of Onset    Diabetes Mother     Hypertension Mother       reports that she has never smoked. She does not have any smokeless tobacco history on file. She reports that she does not drink alcohol and does not use drugs.    Allergies:  No Known Allergies    Home Medications:  Prior to Admission Medications   Prescriptions Last Dose Informant Patient Reported? Taking?   UNKNOWN TO PATIENT - BIRTH CONTROL   Yes Yes   Sig: Take 1 tablet by mouth daily.   Vitamin D, Cholecalciferol, 1000 UNITS Oral Cap Not Taking  Yes No   Sig: Take by mouth.   Patient not taking: Reported on 8/8/2024      Facility-Administered Medications: None       Review of Systems:  Constitutional: Negative  Eye:  Negative.  Ear/Nose/Mouth/Throat:  Negative.  Respiratory:  Negative  Cardiovascular: Negative  Gastrointestinal:  Negative.  Genitourinary: Abdominal pain/nausea  Endocrine:  Negative.  Immunologic:  Negative.  Musculoskeletal:  Negative.  Integumentary:  Negative.  Neurologic:  Negative.  Psychiatric:  Negative.  ROS reviewed as documented in chart    Physical Exam:  Temp:  [99.2 °F (37.3 °C)] 99.2 °F (37.3 °C)  Pulse:  [58-86] 67  Resp:  [18] 18  BP: (118-152)/(75-93) 118/84  SpO2:  [96 %-98 %] 97 %    General:  Alert and oriented.  Diffuse skin  problem:  None.  Eye:  Pupils are equal, round and reactive to light, extraocular movements are intact, Normal conjunctiva.  HENT:  Normocephalic, oral mucosa is moist.  Head:  Normocephalic, atraumatic.  Neck:  Supple, non-tender, no carotid bruit, no jugular venous distention, no lymphadenopathy, no thyromegaly.  Respiratory:  Lungs are clear to auscultation, respirations are non-labored, breath sounds are equal, symmetrical chest wall expansion.  Cardiovascular:  Normal rate, regular rhythm, no murmur, no edema.  Gastrointestinal:  Soft, tender in the RUQ, non-distended, normal bowel sounds, no organomegaly.  Lymphatics:  No lymphadenopathy neck, axilla, groin.  Musculoskeletal: Normal range of motion.  normal strength.  Feet:  Normal pulses.  Neurologic:  Alert, oriented, no focal deficits, cranial nerves II-XII are grossly intact.  Cognition and Speech:  Oriented, speech clear and coherent.  Psychiatric:  Cooperative, appropriate mood & affect.      Laboratory Data:   Lab Results   Component Value Date    WBC 7.0 08/08/2024    HGB 13.7 08/08/2024    HCT 38.6 08/08/2024    .0 08/08/2024    CREATSERUM 0.66 08/08/2024    BUN 8 08/08/2024     08/08/2024    K 3.4 08/08/2024     08/08/2024    CO2 21.0 08/08/2024    GLU 94 08/08/2024    CA 9.8 08/08/2024    ALB 5.0 08/08/2024    ALKPHO 57 08/08/2024    BILT 0.7 08/08/2024    TP 8.2 08/08/2024    AST 23 08/08/2024    ALT 22 08/08/2024    LIP 57 08/08/2024       Imaging:  No results found.     Primary care physician  No primary care provider on file.    60 minutes spent on this admission - examining patient, obtaining history, reviewing previous medical records, going over test results/imaging and discussing plan of care. All questions answered.     Disposition  Clinical course will dictate outcome    Samantha Stern MD  8/9/2024  3:38 AM

## 2024-08-09 NOTE — ED QUICK NOTES
Orders for admission, patient is aware of plan and ready to go upstairs. Any questions, please call ED RN Phyllis at extension [phone].     Patient Covid vaccination status: Unvaccinated     COVID Test Ordered in ED: None    COVID Suspicion at Admission: N/A    Running Infusions:  None    Mental Status/LOC at time of transport: A&Ox4, Mac[place] speaking    Other pertinent information:   CIWA score: N/A   NIH score:  N/A

## 2024-08-10 VITALS
OXYGEN SATURATION: 96 % | WEIGHT: 151.38 LBS | HEART RATE: 76 BPM | DIASTOLIC BLOOD PRESSURE: 90 MMHG | HEIGHT: 62 IN | RESPIRATION RATE: 18 BRPM | SYSTOLIC BLOOD PRESSURE: 136 MMHG | TEMPERATURE: 99 F | BODY MASS INDEX: 27.86 KG/M2

## 2024-08-10 PROBLEM — K81.9 CHOLECYSTITIS: Status: ACTIVE | Noted: 2024-08-09

## 2024-08-10 LAB
ANION GAP SERPL CALC-SCNC: 11 MMOL/L (ref 0–18)
BASOPHILS # BLD AUTO: 0.06 X10(3) UL (ref 0–0.2)
BASOPHILS NFR BLD AUTO: 0.8 %
BUN BLD-MCNC: <5 MG/DL (ref 9–23)
CALCIUM BLD-MCNC: 8.9 MG/DL (ref 8.7–10.4)
CHLORIDE SERPL-SCNC: 110 MMOL/L (ref 98–112)
CO2 SERPL-SCNC: 22 MMOL/L (ref 21–32)
CREAT BLD-MCNC: 0.63 MG/DL
DEPRECATED RDW RBC AUTO: 38.6 FL (ref 35.1–46.3)
EGFRCR SERPLBLD CKD-EPI 2021: 112 ML/MIN/1.73M2 (ref 60–?)
EOSINOPHIL # BLD AUTO: 0.03 X10(3) UL (ref 0–0.7)
EOSINOPHIL NFR BLD AUTO: 0.4 %
ERYTHROCYTE [DISTWIDTH] IN BLOOD BY AUTOMATED COUNT: 12.2 % (ref 11–15)
GLUCOSE BLD-MCNC: 101 MG/DL (ref 70–99)
HCT VFR BLD AUTO: 38.6 %
HGB BLD-MCNC: 13.1 G/DL
IMM GRANULOCYTES # BLD AUTO: 0.02 X10(3) UL (ref 0–1)
IMM GRANULOCYTES NFR BLD: 0.3 %
LYMPHOCYTES # BLD AUTO: 1.84 X10(3) UL (ref 1–4)
LYMPHOCYTES NFR BLD AUTO: 23.5 %
MCH RBC QN AUTO: 29.5 PG (ref 26–34)
MCHC RBC AUTO-ENTMCNC: 33.9 G/DL (ref 31–37)
MCV RBC AUTO: 86.9 FL
MONOCYTES # BLD AUTO: 0.65 X10(3) UL (ref 0.1–1)
MONOCYTES NFR BLD AUTO: 8.3 %
NEUTROPHILS # BLD AUTO: 5.23 X10 (3) UL (ref 1.5–7.7)
NEUTROPHILS # BLD AUTO: 5.23 X10(3) UL (ref 1.5–7.7)
NEUTROPHILS NFR BLD AUTO: 66.7 %
PLATELET # BLD AUTO: 291 10(3)UL (ref 150–450)
POTASSIUM SERPL-SCNC: 3.2 MMOL/L (ref 3.5–5.1)
POTASSIUM SERPL-SCNC: 3.2 MMOL/L (ref 3.5–5.1)
RBC # BLD AUTO: 4.44 X10(6)UL
SODIUM SERPL-SCNC: 143 MMOL/L (ref 136–145)
WBC # BLD AUTO: 7.8 X10(3) UL (ref 4–11)

## 2024-08-10 RX ORDER — POTASSIUM CHLORIDE 20 MEQ/1
40 TABLET, EXTENDED RELEASE ORAL ONCE
Status: COMPLETED | OUTPATIENT
Start: 2024-08-10 | End: 2024-08-10

## 2024-08-10 NOTE — PROGRESS NOTES
[facility]  part of [facility]    Progress Note    Dona Payne Patient Status:  Observation    1980 MRN [id number]   Location [facility] 4W/SW/SE Attending Yudith Calderon MD   Hosp Day # 0 PCP No primary care provider on file.       Subjective:   POD1 lap jose maria. Pt feeling well, pain improved. No nausea or vomiting, tolerating diet.    Objective:   Vital Signs:  Blood pressure 136/90, pulse 76, temperature 98.7 °F (37.1 °C), temperature source Oral, resp. rate 18, height 5' 2\" (1.575 m), weight 151 lb 6.4 oz (68.7 kg), last menstrual period 2024, SpO2 96%.    Physical Exam     General: No acute distress. Alert and oriented x 3.  HEENT: Moist mucous membranes. Anicteric.   Neck: Supple, No JVD.   Respiratory: Nonlabored resp.  Cardiovascular: Regular rate.   Abdomen: Soft, expected incisional tenderness, no rebound tnd, no guarding, nondistended.  No masses. Normal bowel sounds. Incisions c/d/i  Neurologic: No focal neurological deficits.  Musculoskeletal: Full range of motion of all extremities. No calf tenderness. No swelling noted.  Integument: No lesions. No erythema.  Psychiatric: Appropriate mood and affect.         Assessment and Plan:     Cholecystitis      Right sided abdominal pain      Right ovarian cyst    POD1 lap jose maria. Pt recovering well. Stable for discharge home from surgical standpoint. Continue soft diet. Follow up in 2 weeks for postoperative appointment.    Results:     Lab Results   Component Value Date    WBC 7.8 08/10/2024    HGB 13.1 08/10/2024    HCT 38.6 08/10/2024    .0 08/10/2024    CREATSERUM 0.63 08/10/2024    BUN <5 (L) 08/10/2024     08/10/2024    K 3.2 (L) 08/10/2024    K 3.2 (L) 08/10/2024     08/10/2024    CO2 22.0 08/10/2024     (H) 08/10/2024    CA 8.9 08/10/2024    ALB 5.0 (H) 2024    ALKPHO 57 2024    BILT 0.7 2024    TP 8.2 2024    AST 23 2024    ALT 22 2024    LIP 57 (H)  08/08/2024       US GALLBLADDER (CPT=76705)    Result Date: 8/9/2024  CONCLUSION:   There is a large stone in the neck of the gallbladder.  The gallbladder is mildly distended.  No gallbladder wall thickening is seen.  The sonographic Child sign is negative but there is reported history of pain medication before the exam.  Findings are  indeterminate for acute cholecystitis.  Consider further evaluation with HIDA scan.  No biliary ductal dilatation.    Preliminary report was given by [facility].  There are no clinically significant discrepancies.    Dictated by (CST): Buddy Graf MD on 8/09/2024 at 10:39 AM     Finalized by (CST): Buddy Graf MD on 8/09/2024 at 10:41 AM          CT ABDOMEN+PELVIS(CONTRAST ONLY)(CPT=74177)    Result Date: 8/9/2024  CONCLUSION:   Cholelithiasis with moderate gallbladder distention and questionable mild gallbladder wall thickening.  No biliary ductal dilatation.  Findings may represent early acute cholecystitis.  Consider further evaluation with right upper quadrant ultrasound.  Punctate nonobstructing left lower pole renal stone.  Right adnexal cyst measuring 2.3 x 1.5 cm.    Preliminary report was given by [facility].  There are no clinically significant discrepancies.    Dictated by (CST): Buddy Graf MD on 8/09/2024 at 10:04 AM     Finalized by (CST): Buddy Graf MD on 8/09/2024 at 10:18 AM                       Lyndon Dalton PA-C  8/10/2024

## 2024-08-10 NOTE — PLAN OF CARE
Orders received for patient discharge. Patient transported home with family. All discharge instructions were discussed with patient and family. Patient verbalizes understanding and agreeable to plan of care & follow-up plan as outlined in discharge summary / AVS and had no further questions regarding discharge instruction. All patient belongings were transported with patient/family at time of discharge.    Dona is aox4, tolerating diet, ambulating ad epifanio, voiding freely, no BM but passing gas. Denies pain. Family at bedside. Call light within reach, calls appropriately. Safety plan in place.     Problem: Patient Centered Care  Goal: Patient preferences are identified and integrated in the patient's plan of care  Description: Interventions:  - What would you like us to know as we care for you?   - Provide timely, complete, and accurate information to patient/family  - Incorporate patient and family knowledge, values, beliefs, and cultural backgrounds into the planning and delivery of care  - Encourage patient/family to participate in care and decision-making at the level they choose  - Honor patient and family perspectives and choices  Outcome: Adequate for Discharge     Problem: Patient/Family Goals  Goal: Patient/Family Long Term Goal  Description: Patient's Long Term Goal:     Interventions:  -   - See additional Care Plan goals for specific interventions  Outcome: Adequate for Discharge  Goal: Patient/Family Short Term Goal  Description: Patient's Short Term Goal:    Interventions:   -   - See additional Care Plan goals for specific interventions  Outcome: Adequate for Discharge     Problem: PAIN - ADULT  Goal: Verbalizes/displays adequate comfort level or patient's stated pain goal  Description: INTERVENTIONS:  - Encourage pt to monitor pain and request assistance  - Assess pain using appropriate pain scale  - Administer analgesics based on type and severity of pain and evaluate response  - Implement  non-pharmacological measures as appropriate and evaluate response  - Consider cultural and social influences on pain and pain management  - Manage/alleviate anxiety  - Utilize distraction and/or relaxation techniques  - Monitor for opioid side effects  - Notify MD/LIP if interventions unsuccessful or patient reports new pain  - Anticipate increased pain with activity and pre-medicate as appropriate  Outcome: Adequate for Discharge     Problem: RISK FOR INFECTION - ADULT  Goal: Absence of fever/infection during anticipated neutropenic period  Description: INTERVENTIONS  - Monitor WBC  - Administer growth factors as ordered  - Implement neutropenic guidelines  Outcome: Adequate for Discharge     Problem: SAFETY ADULT - FALL  Goal: Free from fall injury  Description: INTERVENTIONS:  - Assess pt frequently for physical needs  - Identify cognitive and physical deficits and behaviors that affect risk of falls.  - [place] fall precautions as indicated by assessment.  - Educate pt/family on patient safety including physical limitations  - Instruct pt to call for assistance with activity based on assessment  - Modify environment to reduce risk of injury  - Provide assistive devices as appropriate  - Consider OT/PT consult to assist with strengthening/mobility  - Encourage toileting schedule  Outcome: Adequate for Discharge

## 2024-08-10 NOTE — DISCHARGE SUMMARY
[facility]  part of [facility]    DISCHARGE SUMMARY     Dona Payne Patient Status:  Observation    1980 MRN [id number]   Location [facility] 4W/SW/SE Attending Yudith Calderon MD   Hosp Day # 0 PCP No primary care provider on file.     Date of Admission: 2024  Date of Discharge:  8/10/2024    Discharge Disposition: Final discharge disposition not confirmed    Discharge Diagnosis:     Acute cholecystitis s/p lap jose maria    History of Present Illness:       Dona Payne is a(n) 44 year old female, who presents for evaluation of abdominal pain that started 4 to 5 days prior to current presentation.  She has felt nauseous but has not vomited.  The pain started in the abdomen and radiated to the right upper quadrant, pain is constant and has worsened.  Patient denies previous similar pain in the past.  States that she still has her gallbladder.  No chest pain or shortness of breath.  Denies any fever or chills.  Denies any history of kidney stones.  No increase in urinary frequency or dysuria.  The abdominal pain was not associated with food.  Patient reports that she is otherwise healthy and does not take any medications.  On presentation to the ED, initial vital signs reveal temp 99.2, heart rate 86, respiratory rate 18, blood pressure 152/93, SpO2 98% on room air.  Nontoxic-appearing.  Lab work reveals mildly elevated lipase level 57, liver enzymes within normal limit.  CT abdomen pelvis reveals cholelithiasis with moderate distention of the gallbladder no biliary duct dilatation, however, went ultrasound of the gallbladder reveals stones in the neck of the gallbladder and mild thickening but no pericholecystic fluid.  Case discussed with on-call general surgeon who recommends admission and IV antibiotics, for possible cholecystectomy in the a.m.  Patient received a dose of Rocephin and Flagyl as well as 1 L IV fluid to Zofran as well as Toradol.  Patient admitted under hospitalist  service with consultation to general surgery.     Brief Synopsis:     Patient tolerated surgery well and is cleared for discharge by surgery. The patient is ambulating well, tolerating her diet and states she is urinating well. She denied having a BM but is passing gas.  The patient will follow up with PCP and Gsx as opt for further care.   Discharge meds including pain meds and abx to be ordered by surgical team.    Patient is to remain compliant with all discharge medications and instructions and to follow up as advised.   Patient encouraged to make healthy lifestyle and dietary changes.    Lace+ Score: 21  59-90 High Risk  29-58 Medium Risk  0-28   Low Risk       TCM Follow-Up Recommendation:  LACE < 29: Low Risk of readmission after discharge from the hospital; Still recommend for TCM follow-up.      Procedures during hospitalization:   Lap jose maria    Incidental or significant findings and recommendations (brief descriptions):  None    Lab/Test results pending at Discharge:   None    Consultants:  Gsx    Discharge Medication List:     Discharge Medications        START taking these medications        Instructions Prescription details   HYDROcodone-acetaminophen 5-325 MG Tabs  Commonly known as: Norco      Take 1 tablet by mouth every 6 (six) hours as needed for Pain.   Quantity: 30 tablet  Refills: 0     Polyethylene Glycol 3350 17 g Pack  Commonly known as: MiraLax      Take 17 g by mouth daily for 14 days.   Stop taking on: August 23, 2024  Quantity: 14 packet  Refills: 0     Vitamin D (Cholecalciferol) 25 MCG (1000 UT) Caps      Take by mouth.   Refills: 0            CONTINUE taking these medications        Instructions Prescription details   UNKNOWN TO PATIENT - BIRTH CONTROL      Take 1 tablet by mouth daily.   Refills: 0               Where to Get Your Medications        These medications were sent to [facility]/pharmacy #[id number] - [place], [place] - [address] AT [facility], [phone], [phone]   [address], [facility] [id number]      Hours: 24-hours Phone: [phone]   HYDROcodone-acetaminophen 5-325 MG Tabs  Polyethylene Glycol 3350 17 g Pack         [place]    Follow-up appointment:   Alphonse Pagan MD  [address]  [address]  [facility] [address]  [phone]    Call in 2 week(s)  Post operative phone visit    Appointments for Next 30 Days 8/10/2024 - 9/9/2024      None            Vital signs:  Temp:  [97.6 °F (36.4 °C)-98.9 °F (37.2 °C)] 98.9 °F (37.2 °C)  Pulse:  [66-84] 77  Resp:  [13-19] 18  BP: (114-157)/(68-93) 127/87  SpO2:  [96 %-99 %] 97 %    Physical Exam:    Gen: NAD AO x3  Chest: good air entry CTABL  CVS: normal s1 and s2 RR  Abd: NABS soft NT ND  Neuro: CN 2-12 grossly intact  Ext: no edema in bilateral LE    -----------------------------------------------------------------------------------------------  PATIENT DISCHARGE INSTRUCTIONS: See electronic chart    Yudith Calderon MD  Hospitalist    Time spent:  > 30 minutes    The 21st Century Cures Act makes medical notes like these available to patients in the interest of transparency. Please be advised this is a medical document. Medical documents are intended to carry relevant information, facts as evident, and the clinical opinion of the practitioner. The medical note is intended as peer to peer communication and may appear blunt or direct. It is written in medical language and may contain abbreviations or verbiage that are unfamiliar.

## 2024-08-10 NOTE — PLAN OF CARE
Problem: Patient Centered Care  Goal: Patient preferences are identified and integrated in the patient's plan of care  Description: Interventions:  - What would you like us to know as we care for you? ***  - Provide timely, complete, and accurate information to patient/family  - Incorporate patient and family knowledge, values, beliefs, and cultural backgrounds into the planning and delivery of care  - Encourage patient/family to participate in care and decision-making at the level they choose  - Honor patient and family perspectives and choices  Outcome: Progressing     Problem: Patient/Family Goals  Goal: Patient/Family Long Term Goal  Description: Patient's Long Term Goal: ***    Interventions:  - ***  - See additional Care Plan goals for specific interventions  Outcome: Progressing  Goal: Patient/Family Short Term Goal  Description: Patient's Short Term Goal: ***    Interventions:   - ***  - See additional Care Plan goals for specific interventions  Outcome: Progressing     Problem: PAIN - ADULT  Goal: Verbalizes/displays adequate comfort level or patient's stated pain goal  Description: INTERVENTIONS:  - Encourage pt to monitor pain and request assistance  - Assess pain using appropriate pain scale  - Administer analgesics based on type and severity of pain and evaluate response  - Implement non-pharmacological measures as appropriate and evaluate response  - Consider cultural and social influences on pain and pain management  - Manage/alleviate anxiety  - Utilize distraction and/or relaxation techniques  - Monitor for opioid side effects  - Notify MD/LIP if interventions unsuccessful or patient reports new pain  - Anticipate increased pain with activity and pre-medicate as appropriate  Outcome: Progressing     Problem: RISK FOR INFECTION - ADULT  Goal: Absence of fever/infection during anticipated neutropenic period  Description: INTERVENTIONS  - Monitor WBC  - Administer growth factors as ordered  -  Implement neutropenic guidelines  Outcome: Progressing     Problem: SAFETY ADULT - FALL  Goal: Free from fall injury  Description: INTERVENTIONS:  - Assess pt frequently for physical needs  - Identify cognitive and physical deficits and behaviors that affect risk of falls.  - [place] fall precautions as indicated by assessment.  - Educate pt/family on patient safety including physical limitations  - Instruct pt to call for assistance with activity based on assessment  - Modify environment to reduce risk of injury  - Provide assistive devices as appropriate  - Consider OT/PT consult to assist with strengthening/mobility  - Encourage toileting schedule  Outcome: Progressing

## 2024-08-10 NOTE — PLAN OF CARE
Patient alert and oriented x4.  Patient is Hebrew speaking, daughter overnight.  Patient has PO pain med's, IV fluids, IV Abx.  Patient is independent and voids freely in restroom.  Patient has personal belongings and call light within reach.  Safety measures in place.  Problem: Patient Centered Care  Goal: Patient preferences are identified and integrated in the patient's plan of care  Description: Interventions:  - What would you like us to know as we care for you? I live at home with my family, I speak Hebrew, but understand some English  - Provide timely, complete, and accurate information to patient/family  - Incorporate patient and family knowledge, values, beliefs, and cultural backgrounds into the planning and delivery of care  - Encourage patient/family to participate in care and decision-making at the level they choose  - Honor patient and family perspectives and choices  8/10/2024 0209 by Vanessa Delarosa RN  Outcome: Progressing  8/10/2024 0151 by Vanessa Delarosa RN  Outcome: Progressing     Problem: Patient/Family Goals  Goal: Patient/Family Long Term Goal  Description: Patient's Long Term Goal: To go home with family    Interventions:  - Surgical procedure   -VS/IV fluids  -pain medications as needed  - See additional Care Plan goals for specific interventions  8/10/2024 0209 by Vanessa Delarosa RN  Outcome: Progressing  8/10/2024 0151 by Vanessa Delarosa RN  Outcome: Progressing  Goal: Patient/Family Short Term Goal  Description: Patient's Short Term Goal: To not hurt when I cough    Interventions:   - Educated patient on pillow splinting for pain when coughing  - See additional Care Plan goals for specific interventions  8/10/2024 0209 by Vanessa Delarosa RN  Outcome: Progressing  8/10/2024 0151 by Vanessa Delarosa RN  Outcome: Progressing     Problem: PAIN - ADULT  Goal: Verbalizes/displays adequate comfort level or patient's stated pain goal  Description: INTERVENTIONS:  - Encourage pt to  monitor pain and request assistance  - Assess pain using appropriate pain scale  - Administer analgesics based on type and severity of pain and evaluate response  - Implement non-pharmacological measures as appropriate and evaluate response  - Consider cultural and social influences on pain and pain management  - Manage/alleviate anxiety  - Utilize distraction and/or relaxation techniques  - Monitor for opioid side effects  - Notify MD/LIP if interventions unsuccessful or patient reports new pain  - Anticipate increased pain with activity and pre-medicate as appropriate  8/10/2024 0209 by Vanessa Delarosa RN  Outcome: Progressing  8/10/2024 0151 by Vanessa Delarosa RN  Outcome: Progressing     Problem: RISK FOR INFECTION - ADULT  Goal: Absence of fever/infection during anticipated neutropenic period  Description: INTERVENTIONS  - Monitor WBC  - Administer growth factors as ordered  - Implement neutropenic guidelines  8/10/2024 0209 by Vanessa Delarosa RN  Outcome: Progressing  8/10/2024 0151 by Vanessa Delarosa RN  Outcome: Progressing     Problem: SAFETY ADULT - FALL  Goal: Free from fall injury  Description: INTERVENTIONS:  - Assess pt frequently for physical needs  - Identify cognitive and physical deficits and behaviors that affect risk of falls.  - [place] fall precautions as indicated by assessment.  - Educate pt/family on patient safety including physical limitations  - Instruct pt to call for assistance with activity based on assessment  - Modify environment to reduce risk of injury  - Provide assistive devices as appropriate  - Consider OT/PT consult to assist with strengthening/mobility  - Encourage toileting schedule  8/10/2024 0209 by Vanessa Delarosa RN  Outcome: Progressing  8/10/2024 0151 by Vanessa Delarosa RN  Outcome: Progressing

## 2024-08-27 ENCOUNTER — OFFICE VISIT (OUTPATIENT)
Dept: SURGERY | Facility: CLINIC | Age: 44
End: 2024-08-27

## 2024-08-27 VITALS — HEIGHT: 62 IN | BODY MASS INDEX: 27.79 KG/M2 | WEIGHT: 151 LBS

## 2024-08-27 DIAGNOSIS — Z09 POSTOPERATIVE EXAMINATION: Primary | ICD-10-CM

## 2024-08-27 PROCEDURE — 99024 POSTOP FOLLOW-UP VISIT: CPT | Performed by: SURGERY

## 2024-08-27 NOTE — PROGRESS NOTES
Chief Complaint   Patient presents with    Post-Op     Pt here for post op s/p lap jose maria on 8/9/2024.  Pt denies all complaints at current time.          O:  Ht 5' 2\" (1.575 m)   Wt 151 lb (68.5 kg)   LMP 07/07/2024 (Approximate)   BMI 27.62 kg/m²   GEN:  No acute distress  Abd:  Soft, NTND, incisions C/D/I    Path:  Reviewed w pt    Assessment   1. Postoperative examination        Doing well sp Lap Jose Maria.  Continue to avoid heavy lifting for another month.  F/u prn.         Alphonse Pagan MD

## (undated) DEVICE — SUT COAT VCRL+ 0 27IN UR-6 ABSRB VLT ANTIBACT

## (undated) DEVICE — [HIGH FLOW INSUFFLATOR,  DO NOT USE IF PACKAGE IS DAMAGED,  KEEP DRY,  KEEP AWAY FROM SUNLIGHT,  PROTECT FROM HEAT AND RADIOACTIVE SOURCES.]: Brand: PNEUMOSURE

## (undated) DEVICE — SOLUTION IRRIG 1000ML ST H2O AQUALITE PLAS

## (undated) DEVICE — ADHESIVE SKIN TOP FOR WND CLSR DERMBND ADV

## (undated) DEVICE — CLIP APPLIER WITH CLIP LOGIC TECHNOLOGY: Brand: ENDO CLIP III

## (undated) DEVICE — TISSUE RETRIEVAL SYSTEM: Brand: INZII RETRIEVAL SYSTEM

## (undated) DEVICE — SOLUTION IV 1000ML 0.9% NACL PRESERVATIVE

## (undated) DEVICE — TROCAR: Brand: KII® SLEEVE

## (undated) DEVICE — GAMMEX® PI HYBRID SIZE 7.5, STERILE POWDER-FREE SURGICAL GLOVE, POLYISOPRENE AND NEOPRENE BLEND: Brand: GAMMEX

## (undated) DEVICE — PLUMEPORT ACTIV LAPAROSCOPIC SMOKE FILTRATION DEVICE: Brand: PLUMEPORT ACTIVE

## (undated) DEVICE — SUT PDS II 0 L60IN ABSRB VLT L48MM CTX 1/2

## (undated) DEVICE — SOLUTION IRRIG 1000ML 0.9% NACL USP BTL

## (undated) DEVICE — ELECTRODE ES 2.75IN PTFE BLDE MOD E-Z CLN

## (undated) DEVICE — TROCARS: Brand: KII® BALLOON BLUNT TIP SYSTEM

## (undated) DEVICE — LAP CHOLE: Brand: MEDLINE INDUSTRIES, INC.

## (undated) DEVICE — SUT MCRYL 4-0 18IN PS-2 ABSRB UD 19MM 3/8 CIR

## (undated) DEVICE — TROCAR: Brand: KII FIOS FIRST ENTRY

## (undated) DEVICE — MARYLAND JAW LAPAROSCOPIC SEALER/DIVIDER COATED: Brand: LIGASURE

## (undated) DEVICE — BINDER ABD L/XL H12XL62IN 4 PNL UNIV PREM